# Patient Record
Sex: FEMALE | Race: BLACK OR AFRICAN AMERICAN | NOT HISPANIC OR LATINO | URBAN - METROPOLITAN AREA
[De-identification: names, ages, dates, MRNs, and addresses within clinical notes are randomized per-mention and may not be internally consistent; named-entity substitution may affect disease eponyms.]

---

## 2023-09-27 ENCOUNTER — LAB OUTSIDE AN ENCOUNTER (OUTPATIENT)
Dept: URBAN - METROPOLITAN AREA CLINIC 25 | Facility: CLINIC | Age: 53
End: 2023-09-27

## 2023-09-27 ENCOUNTER — WEB ENCOUNTER (OUTPATIENT)
Dept: URBAN - METROPOLITAN AREA CLINIC 25 | Facility: CLINIC | Age: 53
End: 2023-09-27

## 2023-09-27 ENCOUNTER — OFFICE VISIT (OUTPATIENT)
Dept: URBAN - METROPOLITAN AREA CLINIC 25 | Facility: CLINIC | Age: 53
End: 2023-09-27
Payer: MEDICARE

## 2023-09-27 ENCOUNTER — DASHBOARD ENCOUNTERS (OUTPATIENT)
Age: 53
End: 2023-09-27

## 2023-09-27 VITALS
TEMPERATURE: 97.2 F | SYSTOLIC BLOOD PRESSURE: 113 MMHG | BODY MASS INDEX: 24.01 KG/M2 | DIASTOLIC BLOOD PRESSURE: 78 MMHG | WEIGHT: 153 LBS | HEIGHT: 67 IN | HEART RATE: 91 BPM

## 2023-09-27 DIAGNOSIS — R13.14 PHARYNGOESOPHAGEAL DYSPHAGIA: ICD-10-CM

## 2023-09-27 DIAGNOSIS — R10.13 EPIGASTRIC ABDOMINAL PAIN: ICD-10-CM

## 2023-09-27 DIAGNOSIS — R68.81 EARLY SATIETY: ICD-10-CM

## 2023-09-27 DIAGNOSIS — K58.0 IBS-D: ICD-10-CM

## 2023-09-27 PROCEDURE — 99204 OFFICE O/P NEW MOD 45 MIN: CPT | Performed by: INTERNAL MEDICINE

## 2023-09-27 RX ORDER — GABAPENTIN 400 MG/1
TAKE 1 CAPSULE (400 MG) BY ORAL ROUTE 3 TIMES PER DAY CAPSULE ORAL
Qty: 0 | Refills: 0 | Status: ACTIVE | COMMUNITY
Start: 1900-01-01

## 2023-09-27 RX ORDER — GLIPIZIDE 10 MG/1
TABLET ORAL
Qty: 180 TABLET | Status: ACTIVE | COMMUNITY

## 2023-09-27 RX ORDER — RIFAXIMIN 550 MG/1
1 TABLET TABLET ORAL THREE TIMES A DAY
Qty: 42 TABLET | Refills: 2 | OUTPATIENT
Start: 2023-09-27 | End: 2023-11-07

## 2023-09-27 RX ORDER — GABAPENTIN 600 MG/1
TABLET, FILM COATED ORAL
Qty: 90 TABLET | Status: ACTIVE | COMMUNITY

## 2023-09-27 RX ORDER — ROSUVASTATIN CALCIUM 10 MG/1
TABLET, FILM COATED ORAL
Qty: 60 TABLET | Status: ACTIVE | COMMUNITY

## 2023-09-27 RX ORDER — CHOLESTYRAMINE 4 G/5.5G
DISSOLVE AS DIRECTED 1 PACKET AND TAKE BY ORAL ROUTE 4 TIMES A DAY AS NEEDED POWDER, FOR SUSPENSION ORAL
Qty: 90 | Refills: 5 | Status: ACTIVE | COMMUNITY
Start: 2019-03-24

## 2023-09-27 RX ORDER — HYDROCODONE/ACETAMINOPHEN 7.5-325 MG
TAKE 1 TABLET BY ORAL ROUTE 3 TIMES A DAY TABLET ORAL
Qty: 0 | Refills: 0 | Status: ACTIVE | COMMUNITY
Start: 1900-01-01

## 2023-09-27 RX ORDER — DICYCLOMINE HYDROCHLORIDE 20 MG/1
TAKE 1 TABLET BY ORAL ROUTE 4 TIMES A DAY AS NEEDED TABLET ORAL
Qty: 40 | Refills: 2 | Status: ACTIVE | COMMUNITY
Start: 2019-03-13

## 2023-09-27 RX ORDER — METFORMIN HYDROCHLORIDE 500 MG/1
TAKE 1 TABLET (500 MG) BY ORAL ROUTE 2 TIMES PER DAY WITH MORNING AND EVENING MEALS TABLET, COATED ORAL 2
Qty: 0 | Refills: 0 | Status: ACTIVE | COMMUNITY
Start: 1900-01-01

## 2023-09-27 RX ORDER — OXYCODONE AND ACETAMINOPHEN 10; 325 MG/1; MG/1
TABLET ORAL
Qty: 120 TABLET | Status: ACTIVE | COMMUNITY

## 2023-09-27 RX ORDER — AZITHROMYCIN DIHYDRATE 250 MG/1
TABLET ORAL
Qty: 6 TABLET | Status: ON HOLD | COMMUNITY

## 2023-09-27 NOTE — HPI-TODAY'S VISIT:
This patient was referred by Dr. Quique Sullivan for an evaluation of GI complaints.  A copy of this will be sent to the referring provider.  SHe has a lot of bloat for the past month.  She also has an epiagstric pain for the past month as well.  She denies anorexia or weight loss.  She has a lot of belch with a "foul odor."  She has early satiety.  She has been having irregular BM's with alternating constipation/diarrhea.  She denies LGI bleed or melena.  She has intermittent N/V.  She denies dysphagia.  Her MD is not well controlled.  SHe denies recent alcohol.  She denies NSAID's.  SHe has been on narcotics for about a year.  She denies THC.  She does smoke tobacco.  SHe had a stress a few months ago and she is scheduled to see Cardiology in follow up.  She has not had prior EGD.  Colonoscopy 4 years ago was normal.  There was no microscopic colitis and no polyps.  Her mither had colon cancer when she was under 60.  Her maternal aunt and uncle had colon cancer.  Her father had colon cancer

## 2023-10-09 ENCOUNTER — CLAIMS CREATED FROM THE CLAIM WINDOW (OUTPATIENT)
Dept: URBAN - METROPOLITAN AREA SURGERY CENTER 20 | Facility: SURGERY CENTER | Age: 53
End: 2023-10-09
Payer: MEDICARE

## 2023-10-09 ENCOUNTER — TELEPHONE ENCOUNTER (OUTPATIENT)
Dept: URBAN - METROPOLITAN AREA CLINIC 84 | Facility: CLINIC | Age: 53
End: 2023-10-09

## 2023-10-09 ENCOUNTER — CLAIMS CREATED FROM THE CLAIM WINDOW (OUTPATIENT)
Dept: URBAN - METROPOLITAN AREA CLINIC 4 | Facility: CLINIC | Age: 53
End: 2023-10-09
Payer: MEDICARE

## 2023-10-09 DIAGNOSIS — K44.9 HIATAL HERNIA: ICD-10-CM

## 2023-10-09 DIAGNOSIS — K31.89 OTHER DISEASES OF STOMACH AND DUODENUM: ICD-10-CM

## 2023-10-09 DIAGNOSIS — R11.2 ACUTE NAUSEA WITH NONBILIOUS VOMITING: ICD-10-CM

## 2023-10-09 DIAGNOSIS — R10.13 ABDOMINAL DISCOMFORT, EPIGASTRIC: ICD-10-CM

## 2023-10-09 DIAGNOSIS — K31.89: ICD-10-CM

## 2023-10-09 DIAGNOSIS — R10.13 EPIGASTRIC PAIN: ICD-10-CM

## 2023-10-09 PROCEDURE — 43239 EGD BIOPSY SINGLE/MULTIPLE: CPT | Performed by: INTERNAL MEDICINE

## 2023-10-09 PROCEDURE — 88305 TISSUE EXAM BY PATHOLOGIST: CPT | Performed by: PATHOLOGY

## 2023-10-09 PROCEDURE — G8907 PT DOC NO EVENTS ON DISCHARG: HCPCS | Performed by: INTERNAL MEDICINE

## 2023-10-09 PROCEDURE — 00731 ANES UPR GI NDSC PX NOS: CPT | Performed by: NURSE ANESTHETIST, CERTIFIED REGISTERED

## 2023-10-09 PROCEDURE — 88312 SPECIAL STAINS GROUP 1: CPT | Performed by: PATHOLOGY

## 2023-10-09 RX ORDER — GABAPENTIN 600 MG/1
TABLET, FILM COATED ORAL
Qty: 90 TABLET | Status: ACTIVE | COMMUNITY

## 2023-10-09 RX ORDER — AZITHROMYCIN DIHYDRATE 250 MG/1
TABLET ORAL
Qty: 6 TABLET | Status: ON HOLD | COMMUNITY

## 2023-10-09 RX ORDER — CHOLESTYRAMINE 4 G/5.5G
DISSOLVE AS DIRECTED 1 PACKET AND TAKE BY ORAL ROUTE 4 TIMES A DAY AS NEEDED POWDER, FOR SUSPENSION ORAL
Qty: 90 | Refills: 5 | Status: ACTIVE | COMMUNITY
Start: 2019-03-24

## 2023-10-09 RX ORDER — GABAPENTIN 400 MG/1
TAKE 1 CAPSULE (400 MG) BY ORAL ROUTE 3 TIMES PER DAY CAPSULE ORAL
Qty: 0 | Refills: 0 | Status: ACTIVE | COMMUNITY
Start: 1900-01-01

## 2023-10-09 RX ORDER — RIFAXIMIN 550 MG/1
1 TABLET TABLET ORAL THREE TIMES A DAY
Qty: 42 TABLET | Refills: 2 | Status: ACTIVE | COMMUNITY
Start: 2023-09-27 | End: 2023-11-07

## 2023-10-09 RX ORDER — HYDROCODONE/ACETAMINOPHEN 7.5-325 MG
TAKE 1 TABLET BY ORAL ROUTE 3 TIMES A DAY TABLET ORAL
Qty: 0 | Refills: 0 | Status: ACTIVE | COMMUNITY
Start: 1900-01-01

## 2023-10-09 RX ORDER — METFORMIN HYDROCHLORIDE 500 MG/1
TAKE 1 TABLET (500 MG) BY ORAL ROUTE 2 TIMES PER DAY WITH MORNING AND EVENING MEALS TABLET, COATED ORAL 2
Qty: 0 | Refills: 0 | Status: ACTIVE | COMMUNITY
Start: 1900-01-01

## 2023-10-09 RX ORDER — GLIPIZIDE 10 MG/1
TABLET ORAL
Qty: 180 TABLET | Status: ACTIVE | COMMUNITY

## 2023-10-09 RX ORDER — DICYCLOMINE HYDROCHLORIDE 20 MG/1
TAKE 1 TABLET BY ORAL ROUTE 4 TIMES A DAY AS NEEDED TABLET ORAL
Qty: 40 | Refills: 2 | Status: ACTIVE | COMMUNITY
Start: 2019-03-13

## 2023-10-09 RX ORDER — ROSUVASTATIN CALCIUM 10 MG/1
TABLET, FILM COATED ORAL
Qty: 60 TABLET | Status: ACTIVE | COMMUNITY

## 2023-10-09 RX ORDER — OXYCODONE AND ACETAMINOPHEN 10; 325 MG/1; MG/1
TABLET ORAL
Qty: 120 TABLET | Status: ACTIVE | COMMUNITY